# Patient Record
Sex: FEMALE | Race: WHITE | NOT HISPANIC OR LATINO | URBAN - NONMETROPOLITAN AREA
[De-identification: names, ages, dates, MRNs, and addresses within clinical notes are randomized per-mention and may not be internally consistent; named-entity substitution may affect disease eponyms.]

---

## 2021-06-23 ENCOUNTER — OFFICE VISIT (OUTPATIENT)
Dept: URBAN - NONMETROPOLITAN AREA CLINIC 2 | Facility: CLINIC | Age: 36
End: 2021-06-23
Payer: COMMERCIAL

## 2021-06-23 ENCOUNTER — WEB ENCOUNTER (OUTPATIENT)
Dept: URBAN - NONMETROPOLITAN AREA CLINIC 2 | Facility: CLINIC | Age: 36
End: 2021-06-23

## 2021-06-23 DIAGNOSIS — R19.5 LOOSE STOOLS: ICD-10-CM

## 2021-06-23 DIAGNOSIS — R10.84 GENERALIZED ABDOMINAL PAIN: ICD-10-CM

## 2021-06-23 PROCEDURE — 99204 OFFICE O/P NEW MOD 45 MIN: CPT | Performed by: INTERNAL MEDICINE

## 2021-06-23 RX ORDER — SODIUM, POTASSIUM,MAG SULFATES 17.5-3.13G
177 ML SOLUTION, RECONSTITUTED, ORAL ORAL ONCE
Qty: 177 MILLILITER | Refills: 0 | OUTPATIENT
Start: 2021-06-23 | End: 2021-06-24

## 2021-06-23 NOTE — HPI-TODAY'S VISIT:
Discussed condition and exacerbating conditions/situations (e.g., dry/arid environments, overhead fans, air conditioners, side effect of medications). Ms. Ortiz is a 35-year-old female who presents to discuss ongoing loose stool and abdominal pain.  She states she is chronically had generalized severe abdominal pain at times as well as loose stool.  She typically uses the bathroom 4 times in the morning before breakfast.  She does occasionally wake up at night with severe abdominal pain that is relieved with bowel movement.  She states she saw GI about 3 years ago and had an endoscopy and colonoscopy to rule out Crohn's, which was negative.  However, recently she has been diagnosed with a chest and put on Humira with a dermatologist.  This was about 3 years ago.  Her dermatologist recommended getting another Crohn's eval.  She initially was not going to, however, about 2 weeks ago, her mother was hospitalized about 3 days after her Covid vaccine ended at passing away from a GI bleed?  Patient states that was what was on her death certificate.  Patient would like to undergo evaluation for Crohn's again.  She does report that she missed a dose of her Humira after her mom's death, and she had a lot of worsening GI complaints as well as mucus in the stool when she was off of this medication.  She does not report that has really made a big difference in her GI complaints overall.  She has a history of PE.  It was diagnosed about 4 years ago.  She is not on any blood thinners. Sb

## 2021-08-10 ENCOUNTER — OFFICE VISIT (OUTPATIENT)
Dept: URBAN - NONMETROPOLITAN AREA SURGERY CENTER 1 | Facility: SURGERY CENTER | Age: 36
End: 2021-08-10

## 2021-09-15 ENCOUNTER — OFFICE VISIT (OUTPATIENT)
Dept: URBAN - NONMETROPOLITAN AREA CLINIC 2 | Facility: CLINIC | Age: 36
End: 2021-09-15

## 2021-09-28 ENCOUNTER — OFFICE VISIT (OUTPATIENT)
Dept: URBAN - NONMETROPOLITAN AREA SURGERY CENTER 1 | Facility: SURGERY CENTER | Age: 36
End: 2021-09-28

## 2021-10-27 ENCOUNTER — OFFICE VISIT (OUTPATIENT)
Dept: URBAN - NONMETROPOLITAN AREA CLINIC 2 | Facility: CLINIC | Age: 36
End: 2021-10-27

## 2022-07-28 ENCOUNTER — OFFICE VISIT (OUTPATIENT)
Dept: URBAN - NONMETROPOLITAN AREA CLINIC 2 | Facility: CLINIC | Age: 37
End: 2022-07-28
Payer: COMMERCIAL

## 2022-07-28 ENCOUNTER — DASHBOARD ENCOUNTERS (OUTPATIENT)
Age: 37
End: 2022-07-28

## 2022-07-28 VITALS
BODY MASS INDEX: 39.27 KG/M2 | SYSTOLIC BLOOD PRESSURE: 130 MMHG | TEMPERATURE: 97.2 F | HEART RATE: 99 BPM | WEIGHT: 230 LBS | HEIGHT: 64 IN | DIASTOLIC BLOOD PRESSURE: 95 MMHG

## 2022-07-28 DIAGNOSIS — R10.84 GENERALIZED ABDOMINAL PAIN: ICD-10-CM

## 2022-07-28 DIAGNOSIS — R19.5 LOOSE STOOLS: ICD-10-CM

## 2022-07-28 DIAGNOSIS — R19.5 MUCOUS IN STOOLS: ICD-10-CM

## 2022-07-28 PROCEDURE — 99214 OFFICE O/P EST MOD 30 MIN: CPT | Performed by: NURSE PRACTITIONER

## 2022-07-28 PROCEDURE — 99214 OFFICE O/P EST MOD 30 MIN: CPT | Performed by: INTERNAL MEDICINE

## 2022-07-28 RX ORDER — SODIUM PICOSULFATE, MAGNESIUM OXIDE, AND ANHYDROUS CITRIC ACID 10; 3.5; 12 MG/160ML; G/160ML; G/160ML
160ML LIQUID ORAL
Qty: 1 BOX | Refills: 0 | OUTPATIENT
Start: 2022-07-28 | End: 2022-07-29

## 2022-07-28 NOTE — HPI-TODAY'S VISIT:
Consult: Ms. Ortiz is a 36-year-old female who presents to discuss ongoing loose stool and abdominal pain.  She states she is chronically had generalized severe abdominal pain at times as well as loose stool.  She typically uses the bathroom 4 times in the morning before breakfast.  She does occasionally wake up at night with severe abdominal pain that is relieved with bowel movement.  She states she saw GI about 3 years ago and had an endoscopy and colonoscopy to rule out Crohn's, which was negative.  However, recently she has been diagnosed with HS and put on Humira with a dermatologist.  This was about 3 years ago.  Her dermatologist recommended getting another Crohn's eval.  Patient would like to undergo evaluation for Crohn's again.  She does report that she missed a dose of her Humira after her mom's death, and she had a lot of worsening GI complaints as well as mucus in the stool when she was off of this medication.  She does not report that has really made a big difference in her GI complaints overall.  She has a history of PE.  It was diagnosed about 4 years ago.  She is not on any blood thinners. Sb 7/28/2022 Ms. Ortiz is a 36-year-old female who returns for follow-up to discuss an evaluation of her lower Crohn's disease.  She has had no change in above complaints.  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  Recently, she had some sort of event where she had some intense joint pain and rheumatology has been unable to come up with a diagnosis and requesting that she get an evaluation for Crohn's.  She is open to scheduling endoscopy and colonoscopy today.  Since her last office visit, she has stopped Humira.  She was not having any issues with it, but she just was not following up with physicians much last year due to life situations. Sb

## 2022-08-10 ENCOUNTER — OFFICE VISIT (OUTPATIENT)
Dept: URBAN - NONMETROPOLITAN AREA SURGERY CENTER 1 | Facility: SURGERY CENTER | Age: 37
End: 2022-08-10
Payer: COMMERCIAL

## 2022-08-10 DIAGNOSIS — R19.7 ACUTE DIARRHEA: ICD-10-CM

## 2022-08-10 DIAGNOSIS — K29.30 CHRONIC SUPERFICIAL GASTRITIS: ICD-10-CM

## 2022-08-10 DIAGNOSIS — R10.84 ABDOMINAL CRAMPING, GENERALIZED: ICD-10-CM

## 2022-08-10 PROCEDURE — 43239 EGD BIOPSY SINGLE/MULTIPLE: CPT | Performed by: INTERNAL MEDICINE

## 2022-08-10 PROCEDURE — G8907 PT DOC NO EVENTS ON DISCHARG: HCPCS | Performed by: INTERNAL MEDICINE

## 2022-08-10 PROCEDURE — 45378 DIAGNOSTIC COLONOSCOPY: CPT | Performed by: INTERNAL MEDICINE

## 2022-10-05 ENCOUNTER — OFFICE VISIT (OUTPATIENT)
Dept: URBAN - NONMETROPOLITAN AREA CLINIC 2 | Facility: CLINIC | Age: 37
End: 2022-10-05

## 2022-11-11 ENCOUNTER — OFFICE VISIT (OUTPATIENT)
Dept: URBAN - NONMETROPOLITAN AREA CLINIC 2 | Facility: CLINIC | Age: 37
End: 2022-11-11